# Patient Record
Sex: FEMALE | Race: WHITE | NOT HISPANIC OR LATINO | Employment: OTHER | URBAN - NONMETROPOLITAN AREA
[De-identification: names, ages, dates, MRNs, and addresses within clinical notes are randomized per-mention and may not be internally consistent; named-entity substitution may affect disease eponyms.]

---

## 2022-05-05 ENCOUNTER — LAB (OUTPATIENT)
Dept: LAB | Facility: OTHER | Age: 77
End: 2022-05-05
Attending: PHYSICIAN ASSISTANT
Payer: MEDICARE

## 2022-05-05 ENCOUNTER — HOSPITAL ENCOUNTER (OUTPATIENT)
Dept: GENERAL RADIOLOGY | Facility: OTHER | Age: 77
Discharge: HOME | End: 2022-05-05
Attending: PHYSICIAN ASSISTANT
Payer: MEDICARE

## 2022-05-05 ENCOUNTER — OFFICE VISIT (OUTPATIENT)
Dept: FAMILY MEDICINE | Facility: OTHER | Age: 77
End: 2022-05-05
Attending: PHYSICIAN ASSISTANT
Payer: MEDICARE

## 2022-05-05 VITALS
DIASTOLIC BLOOD PRESSURE: 86 MMHG | HEART RATE: 75 BPM | BODY MASS INDEX: 27.98 KG/M2 | SYSTOLIC BLOOD PRESSURE: 112 MMHG | TEMPERATURE: 98.2 F | RESPIRATION RATE: 22 BRPM | OXYGEN SATURATION: 95 % | WEIGHT: 192.2 LBS

## 2022-05-05 DIAGNOSIS — R10.9 ACUTE RIGHT FLANK PAIN: ICD-10-CM

## 2022-05-05 DIAGNOSIS — M94.0 ACUTE COSTOCHONDRITIS: Primary | ICD-10-CM

## 2022-05-05 PROBLEM — Z90.13 HISTORY OF BILATERAL MASTECTOMY: Status: ACTIVE | Noted: 2017-04-26

## 2022-05-05 PROBLEM — G61.9 INFLAMMATORY NEUROPATHY (H): Status: ACTIVE | Noted: 2017-10-03

## 2022-05-05 PROBLEM — D12.6 TUBULAR ADENOMA OF COLON: Status: ACTIVE | Noted: 2017-07-17

## 2022-05-05 LAB
ALBUMIN SERPL-MCNC: 4.1 G/DL (ref 3.5–5.7)
ALBUMIN UR-MCNC: NEGATIVE MG/DL
ALP SERPL-CCNC: 49 U/L (ref 34–104)
ALT SERPL W P-5'-P-CCNC: 14 U/L (ref 7–52)
ANION GAP SERPL CALCULATED.3IONS-SCNC: 7 MMOL/L (ref 3–14)
APPEARANCE UR: CLEAR
AST SERPL W P-5'-P-CCNC: 16 U/L (ref 13–39)
BACTERIA #/AREA URNS HPF: ABNORMAL /HPF
BASOPHILS # BLD AUTO: 0 10E3/UL (ref 0–0.2)
BASOPHILS NFR BLD AUTO: 1 %
BILIRUB SERPL-MCNC: 0.6 MG/DL (ref 0.3–1)
BILIRUB UR QL STRIP: NEGATIVE
BUN SERPL-MCNC: 17 MG/DL (ref 7–25)
CALCIUM SERPL-MCNC: 9 MG/DL (ref 8.6–10.3)
CHLORIDE BLD-SCNC: 105 MMOL/L (ref 98–107)
CO2 SERPL-SCNC: 27 MMOL/L (ref 21–31)
COLOR UR AUTO: YELLOW
CREAT SERPL-MCNC: 0.85 MG/DL (ref 0.6–1.2)
EOSINOPHIL # BLD AUTO: 0.3 10E3/UL (ref 0–0.7)
EOSINOPHIL NFR BLD AUTO: 5 %
ERYTHROCYTE [DISTWIDTH] IN BLOOD BY AUTOMATED COUNT: 12.5 % (ref 10–15)
GFR SERPL CREATININE-BSD FRML MDRD: 69 ML/MIN/1.73M2
GLUCOSE BLD-MCNC: 98 MG/DL (ref 70–105)
GLUCOSE UR STRIP-MCNC: NEGATIVE MG/DL
HCT VFR BLD AUTO: 40.1 % (ref 35–47)
HGB BLD-MCNC: 13 G/DL (ref 11.7–15.7)
HGB UR QL STRIP: NEGATIVE
IMM GRANULOCYTES # BLD: 0 10E3/UL
IMM GRANULOCYTES NFR BLD: 0 %
KETONES UR STRIP-MCNC: NEGATIVE MG/DL
LEUKOCYTE ESTERASE UR QL STRIP: ABNORMAL
LYMPHOCYTES # BLD AUTO: 1.8 10E3/UL (ref 0.8–5.3)
LYMPHOCYTES NFR BLD AUTO: 34 %
MCH RBC QN AUTO: 31.1 PG (ref 26.5–33)
MCHC RBC AUTO-ENTMCNC: 32.4 G/DL (ref 31.5–36.5)
MCV RBC AUTO: 96 FL (ref 78–100)
MONOCYTES # BLD AUTO: 0.4 10E3/UL (ref 0–1.3)
MONOCYTES NFR BLD AUTO: 8 %
NEUTROPHILS # BLD AUTO: 2.7 10E3/UL (ref 1.6–8.3)
NEUTROPHILS NFR BLD AUTO: 52 %
NITRATE UR QL: NEGATIVE
NRBC # BLD AUTO: 0 10E3/UL
NRBC BLD AUTO-RTO: 0 /100
PH UR STRIP: 6 [PH] (ref 5–9)
PLATELET # BLD AUTO: 219 10E3/UL (ref 150–450)
POTASSIUM BLD-SCNC: 4.3 MMOL/L (ref 3.5–5.1)
PROT SERPL-MCNC: 7 G/DL (ref 6.4–8.9)
RBC # BLD AUTO: 4.18 10E6/UL (ref 3.8–5.2)
RBC URINE: <1 /HPF
SODIUM SERPL-SCNC: 139 MMOL/L (ref 134–144)
SP GR UR STRIP: 1.01 (ref 1–1.03)
SQUAMOUS EPITHELIAL: 4 /HPF
TRANSITIONAL EPI: 1 /HPF
UROBILINOGEN UR STRIP-MCNC: NORMAL MG/DL
WBC # BLD AUTO: 5.2 10E3/UL (ref 4–11)
WBC URINE: 6 /HPF

## 2022-05-05 PROCEDURE — 85004 AUTOMATED DIFF WBC COUNT: CPT | Mod: ZL | Performed by: PHYSICIAN ASSISTANT

## 2022-05-05 PROCEDURE — 36415 COLL VENOUS BLD VENIPUNCTURE: CPT | Mod: ZL | Performed by: PHYSICIAN ASSISTANT

## 2022-05-05 PROCEDURE — 87086 URINE CULTURE/COLONY COUNT: CPT | Mod: ZL

## 2022-05-05 PROCEDURE — 80053 COMPREHEN METABOLIC PANEL: CPT | Mod: ZL | Performed by: PHYSICIAN ASSISTANT

## 2022-05-05 PROCEDURE — 72072 X-RAY EXAM THORAC SPINE 3VWS: CPT

## 2022-05-05 PROCEDURE — G0463 HOSPITAL OUTPT CLINIC VISIT: HCPCS | Mod: 25

## 2022-05-05 PROCEDURE — 99203 OFFICE O/P NEW LOW 30 MIN: CPT | Performed by: PHYSICIAN ASSISTANT

## 2022-05-05 PROCEDURE — 71101 X-RAY EXAM UNILAT RIBS/CHEST: CPT | Mod: RT

## 2022-05-05 PROCEDURE — 81001 URINALYSIS AUTO W/SCOPE: CPT | Mod: ZL

## 2022-05-05 PROCEDURE — G0463 HOSPITAL OUTPT CLINIC VISIT: HCPCS

## 2022-05-05 RX ORDER — TEA TREE OIL 100 %
OIL (ML) TOPICAL DAILY
COMMUNITY

## 2022-05-05 RX ORDER — ZINC GLUCONATE 50 MG
50 TABLET ORAL
COMMUNITY

## 2022-05-05 RX ORDER — ATORVASTATIN CALCIUM 20 MG/1
TABLET, FILM COATED ORAL DAILY
COMMUNITY

## 2022-05-05 RX ORDER — RALOXIFENE HYDROCHLORIDE 60 MG/1
60 TABLET, FILM COATED ORAL
COMMUNITY
Start: 2021-11-03

## 2022-05-05 RX ORDER — IBUPROFEN 200 MG
TABLET ORAL
COMMUNITY

## 2022-05-05 ASSESSMENT — ENCOUNTER SYMPTOMS
BACK PAIN: 1
HEMATOCHEZIA: 0
ABDOMINAL PAIN: 0
CHILLS: 0
NAUSEA: 0
MYALGIAS: 0
PALPITATIONS: 0
EYE PAIN: 0
FLANK PAIN: 1
DIZZINESS: 0
VOMITING: 0
HEMATURIA: 0
DIARRHEA: 0
FATIGUE: 0
FEVER: 0
COUGH: 0
CONSTIPATION: 0
SHORTNESS OF BREATH: 0
LIGHT-HEADEDNESS: 0
ACTIVITY CHANGE: 0
SORE THROAT: 0
FREQUENCY: 0
DYSURIA: 0
WHEEZING: 0
PSYCHIATRIC NEGATIVE: 1

## 2022-05-05 ASSESSMENT — PAIN SCALES - GENERAL: PAINLEVEL: EXTREME PAIN (8)

## 2022-05-05 NOTE — PATIENT INSTRUCTIONS
Acute costochondritis:   Encouraged to take ibuprofen (600 mg) for relief up to 4 times per day.  Can alternate with tylenol (1000 mg) up to 3 times per day as needed.   Encouraged rest and minimal heavy lifting.  Encouraged to use ice or heat 15 minutes at a time several times per day to decrease pain. Return to clinic in the next week as necessary for persistent pain. Return to clinic with any change or worsening of symptoms.     Return urine cup at your convenience.

## 2022-05-05 NOTE — NURSING NOTE
Pt presents to clinic today for lower right back pain that started 2 days ago.       FOOD SECURITY SCREENING QUESTIONS:    The next two questions are to help us understand your food security.  If you are feeling you need any assistance in this area, we have resources available to support you today.    Hunger Vital Signs:  Within the past 12 months we worried whether our food would run out before we got money to buy more. Never  Within the past 12 months the food we bought just didn't last and we didn't have money to get more. Never        Advance care directive on file? no  Advance care directive provided to patient? no     Medication Reconciliation: complete  Libertad Ruiz LPN,LPN on 5/5/2022 at 10:39 AM

## 2022-05-05 NOTE — PROGRESS NOTES
Assessment & Plan   Problem List Items Addressed This Visit    None     Visit Diagnoses     Acute costochondritis    -  Primary    Relevant Medications    ibuprofen (ADVIL/MOTRIN) 200 MG tablet    Acute right flank pain        Relevant Orders    UA reflex to Microscopic    CBC and Differential (Completed)    Comprehensive Metabolic Panel (Completed)    XR Ribs & Chest Rt 3vw (Completed)    XR Thoracic Spine 3 Views (Completed)    UA reflex to Microscopic    Urine Culture Aerobic Bacterial         Completed right rib/chest and thoracic spine xray.  I personally reviewed the xray. I found no fracture appreciated upon initial read of xray.  Final read pending by radiology.    Completed CBC and CMP which are unremarkable.  Attempted to complete a UA in office however patient was unable to complete a urine sample.  We will send patient home with a urine cup to complete a UA/UC to rule out kidney infection or kidney stone concerns.    Symptoms likely due to acute costochondritis.  Gave patient education.    Acute costochondritis:   Encouraged to take ibuprofen (600 mg) for relief up to 4 times per day.  Can alternate with tylenol (1000 mg) up to 3 times per day as needed.   Encouraged rest and minimal heavy lifting.  Encouraged to use ice or heat 15 minutes at a time several times per day to decrease pain. Return to clinic in the next week as necessary for persistent pain. Return to clinic with any change or worsening of symptoms.     Return urine cup at your convenience.     30 minutes spent on the date of the encounter doing chart review, history and exam, documentation and further activities per the note       See Patient Instructions    Return if symptoms worsen or fail to improve.    Amy Bowman PA-C  [facility]   Savannah is a 77 year old who presents for the following health issues  accompanied by her daughter.    Back Pain   This is a new problem. The current episode started  more than 2 days ago. The problem occurs constantly. The problem has been gradually worsening. The pain is associated with no known injury. The symptoms are aggravated by bending, twisting and certain positions. The pain is the same all the time. Pertinent negatives include no chest pain, no fever, no abdominal pain and no dysuria. She has tried nothing for the symptoms.   History of Present Illness       Reason for visit:  Lower back pain  Symptom onset:  1-3 days ago  Symptoms include:  Low right back pain  Symptom intensity:  Severe  Symptom progression:  Worsening    She eats 0-1 servings of fruits and vegetables daily.She exercises with enough effort to increase her heart rate 30 to 60 minutes per day.  She is missing 2 dose(s) of medications per week.       Pain History:  When did you first notice your pain? - Acute Pain   Have you seen anyone else for your pain? No  Where in your body do you have pain? Back Pain  Onset/Duration: 3 days   Description:   Location of pain: low back right  Character of pain: sharp and constant  Pain radiation: none  New numbness or weakness in legs, not attributed to pain: no   Intensity: Currently 8/10, severe  Progression of Symptoms: worsening  History:   Specific cause: none  Pain interferes with job: not applicable  History of back problems: no prior back problems  Any previous MRI or X-rays: None  Sees a specialist for back pain: No  Alleviating factors:   Improved by: none  Precipitating factors:  Worsened by: Lifting, Bending, Standing, Sitting, Lying Flat and Walking  Therapies tried and outcome: none    Patient presents today with her daughter for back pain.  Past medical history is significant for hyperlipidemia, osteopenia, tubular adenoma of the colon, inflammatory polyneuropathy, breast cancer s/p bilateral mastectomy,     Patient has been struggling with right mid back pain over the last 3 days.  Woke up 3 days ago with severe right-sided back pain.  States that it  hurts all the time.  It is not going away.  Ibuprofen helps a little.  No recent injury or trauma.  Has had some reflux in the past.  The current stress with the pain has been flaring her heartburn recently.  Otherwise no new abdominal pain, chest pain, palpitations, problems breathing, nausea, vomiting, diarrhea, constipation, blood in stool or urine, dysuria, frequency, urgency, fevers, chills, cough or cold symptoms, rashes.    Review of Systems   Constitutional: Negative for activity change, chills, fatigue and fever.   HENT: Negative for ear pain and sore throat.    Eyes: Negative for pain.   Respiratory: Negative for cough, shortness of breath and wheezing.    Cardiovascular: Negative for chest pain and palpitations.   Gastrointestinal: Negative for abdominal pain, constipation, diarrhea, hematochezia, nausea and vomiting.   Genitourinary: Positive for flank pain. Negative for dysuria, frequency, hematuria and urgency.   Musculoskeletal: Positive for back pain. Negative for myalgias.   Skin: Negative for rash.   Neurological: Negative for dizziness and light-headedness.   Psychiatric/Behavioral: Negative.             Objective    /86 (BP Location: Right arm, Patient Position: Sitting, Cuff Size: Adult Regular)   Pulse 75   Temp 98.2  F (36.8  C) (Tympanic)   Resp 22   Wt 87.2 kg (192 lb 3.2 oz)   LMP  (LMP Unknown)   SpO2 95%   Breastfeeding No   BMI 27.98 kg/m    Body mass index is 27.98 kg/m .  Physical Exam  Vitals and nursing note reviewed.   Constitutional:       Appearance: Normal appearance.   HENT:      Head: Normocephalic and atraumatic.   Eyes:      Extraocular Movements: Extraocular movements intact.      Conjunctiva/sclera: Conjunctivae normal.      Pupils: Pupils are equal, round, and reactive to light.   Cardiovascular:      Rate and Rhythm: Normal rate and regular rhythm.      Heart sounds: Normal heart sounds.   Pulmonary:      Effort: Pulmonary effort is normal.      Breath  sounds: Normal breath sounds. No wheezing or rales.   Abdominal:      General: Abdomen is flat. Bowel sounds are normal.      Palpations: Abdomen is soft. There is no mass.      Tenderness: There is no abdominal tenderness. There is right CVA tenderness. There is no left CVA tenderness, guarding or rebound.      Hernia: No hernia is present.   Musculoskeletal:         General: Tenderness present. Normal range of motion.      Comments: No spinal or paraspinous muscle pain to palpation.  Right lateral mid back pain with palpation throughout the lower rib line.  No bruising or swelling appreciated.   Skin:     General: Skin is warm and dry.   Neurological:      General: No focal deficit present.      Mental Status: She is alert and oriented to person, place, and time.   Psychiatric:         Mood and Affect: Mood normal.         Behavior: Behavior normal.            Results for orders placed or performed in visit on 05/05/22 (from the past 24 hour(s))   Comprehensive Metabolic Panel   Result Value Ref Range    Sodium 139 134 - 144 mmol/L    Potassium 4.3 3.5 - 5.1 mmol/L    Chloride 105 98 - 107 mmol/L    Carbon Dioxide (CO2) 27 21 - 31 mmol/L    Anion Gap 7 3 - 14 mmol/L    Urea Nitrogen 17 7 - 25 mg/dL    Creatinine 0.85 0.60 - 1.20 mg/dL    Calcium 9.0 8.6 - 10.3 mg/dL    Glucose 98 70 - 105 mg/dL    Alkaline Phosphatase 49 34 - 104 U/L    AST 16 13 - 39 U/L    ALT 14 7 - 52 U/L    Protein Total 7.0 6.4 - 8.9 g/dL    Albumin 4.1 3.5 - 5.7 g/dL    Bilirubin Total 0.6 0.3 - 1.0 mg/dL    GFR Estimate 69 >60 mL/min/1.73m2   CBC and Differential    Narrative    The following orders were created for panel order CBC and Differential.  Procedure                               Abnormality         Status                     ---------                               -----------         ------                     CBC with platelets and d...[[id number]]                      Final result                 Please view results for these  tests on the individual orders.   CBC with platelets and differential   Result Value Ref Range    WBC Count 5.2 4.0 - 11.0 10e3/uL    RBC Count 4.18 3.80 - 5.20 10e6/uL    Hemoglobin 13.0 11.7 - 15.7 g/dL    Hematocrit 40.1 35.0 - 47.0 %    MCV 96 78 - 100 fL    MCH 31.1 26.5 - 33.0 pg    MCHC 32.4 31.5 - 36.5 g/dL    RDW 12.5 10.0 - 15.0 %    Platelet Count 219 150 - 450 10e3/uL    % Neutrophils 52 %    % Lymphocytes 34 %    % Monocytes 8 %    % Eosinophils 5 %    % Basophils 1 %    % Immature Granulocytes 0 %    NRBCs per 100 WBC 0 <1 /100    Absolute Neutrophils 2.7 1.6 - 8.3 10e3/uL    Absolute Lymphocytes 1.8 0.8 - 5.3 10e3/uL    Absolute Monocytes 0.4 0.0 - 1.3 10e3/uL    Absolute Eosinophils 0.3 0.0 - 0.7 10e3/uL    Absolute Basophils 0.0 0.0 - 0.2 10e3/uL    Absolute Immature Granulocytes 0.0 <=0.4 10e3/uL    Absolute NRBCs 0.0 10e3/uL

## 2022-05-07 LAB — BACTERIA UR CULT: NO GROWTH

## 2022-05-21 ENCOUNTER — HEALTH MAINTENANCE LETTER (OUTPATIENT)
Age: 77
End: 2022-05-21

## 2022-09-17 ENCOUNTER — HEALTH MAINTENANCE LETTER (OUTPATIENT)
Age: 77
End: 2022-09-17

## 2023-06-04 ENCOUNTER — HEALTH MAINTENANCE LETTER (OUTPATIENT)
Age: 78
End: 2023-06-04

## 2024-07-13 ENCOUNTER — HEALTH MAINTENANCE LETTER (OUTPATIENT)
Age: 79
End: 2024-07-13